# Patient Record
Sex: FEMALE | Race: WHITE | HISPANIC OR LATINO
[De-identification: names, ages, dates, MRNs, and addresses within clinical notes are randomized per-mention and may not be internally consistent; named-entity substitution may affect disease eponyms.]

---
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
## 2022-01-01 ENCOUNTER — RX RENEWAL (OUTPATIENT)
Age: 22
End: 2022-01-01

## 2022-01-24 ENCOUNTER — RX RENEWAL (OUTPATIENT)
Age: 22
End: 2022-01-24

## 2022-03-05 ENCOUNTER — RX RENEWAL (OUTPATIENT)
Age: 22
End: 2022-03-05

## 2022-04-03 ENCOUNTER — RX RENEWAL (OUTPATIENT)
Age: 22
End: 2022-04-03

## 2022-05-04 ENCOUNTER — RX RENEWAL (OUTPATIENT)
Age: 22
End: 2022-05-04

## 2022-06-06 ENCOUNTER — RX RENEWAL (OUTPATIENT)
Age: 22
End: 2022-06-06

## 2022-06-29 ENCOUNTER — NON-APPOINTMENT (OUTPATIENT)
Age: 22
End: 2022-06-29

## 2022-06-29 ENCOUNTER — APPOINTMENT (OUTPATIENT)
Dept: ELECTROPHYSIOLOGY | Facility: CLINIC | Age: 22
End: 2022-06-29

## 2022-06-29 ENCOUNTER — APPOINTMENT (OUTPATIENT)
Dept: CARDIOLOGY | Facility: CLINIC | Age: 22
End: 2022-06-29
Payer: COMMERCIAL

## 2022-06-29 VITALS
OXYGEN SATURATION: 99 % | WEIGHT: 130 LBS | BODY MASS INDEX: 23.04 KG/M2 | HEIGHT: 63 IN | DIASTOLIC BLOOD PRESSURE: 74 MMHG | SYSTOLIC BLOOD PRESSURE: 122 MMHG | HEART RATE: 63 BPM

## 2022-06-29 VITALS
DIASTOLIC BLOOD PRESSURE: 68 MMHG | BODY MASS INDEX: 23.57 KG/M2 | HEART RATE: 73 BPM | TEMPERATURE: 99 F | SYSTOLIC BLOOD PRESSURE: 100 MMHG | WEIGHT: 133 LBS | OXYGEN SATURATION: 99 % | HEIGHT: 63 IN

## 2022-06-29 DIAGNOSIS — Z12.39 ENCOUNTER FOR OTHER SCREENING FOR MALIGNANT NEOPLASM OF BREAST: ICD-10-CM

## 2022-06-29 DIAGNOSIS — Z12.83 ENCOUNTER FOR SCREENING FOR MALIGNANT NEOPLASM OF SKIN: ICD-10-CM

## 2022-06-29 PROCEDURE — 93000 ELECTROCARDIOGRAM COMPLETE: CPT | Mod: 77

## 2022-06-29 PROCEDURE — 99214 OFFICE O/P EST MOD 30 MIN: CPT | Mod: 25

## 2022-06-29 PROCEDURE — 99204 OFFICE O/P NEW MOD 45 MIN: CPT | Mod: 25

## 2022-06-29 PROCEDURE — 93000 ELECTROCARDIOGRAM COMPLETE: CPT

## 2022-06-29 PROCEDURE — 99214 OFFICE O/P EST MOD 30 MIN: CPT

## 2022-06-29 RX ORDER — VENLAFAXINE HYDROCHLORIDE 37.5 MG/1
37.5 CAPSULE, EXTENDED RELEASE ORAL
Qty: 30 | Refills: 0 | Status: DISCONTINUED | COMMUNITY
Start: 2022-01-24 | End: 2022-06-29

## 2022-06-29 NOTE — REVIEW OF SYSTEMS
[Negative] : Heme/Lymph [SOB] : no shortness of breath [Dyspnea on exertion] : not dyspnea during exertion [Chest Discomfort] : no chest discomfort [Leg Claudication] : no intermittent leg claudication [Palpitations] : no palpitations

## 2022-06-29 NOTE — DISCUSSION/SUMMARY
[FreeTextEntry1] : In summary, this is a 22 year old woman with a cardiovascular history significant for pre-syncope/syncope. Her past medical history is significant for depression, pituitary abnormality, and migraines.\par \par Here recent episode appears to be vagal per history. Her EKG shows no conduction disease. She is currently wearing a Holter, and will be undergoing TTE soon. I will have her return to clinic in 4 weeks. \par  \par  appeared to understand the whole discussion and verbalized that all of her questions were answered to her satisfaction.\par  \par Thank you for allowing me to be involved in the care of this pleasant woman. Please feel free to contact me with any questions.\par

## 2022-06-29 NOTE — HISTORY OF PRESENT ILLNESS
[FreeTextEntry1] : This is a 23yo female with PMH of depression and pituitary abnormality who presents today for follow-up. Patient reports that approximately 1 month ago she had a near syncope instead. Patient states she was at work, felt cold and lost vision, was shaking and her co-workers assisted her to sit. Patient did not lose consciousness, states symptoms lasted for approximately 1 minute. Patient states that after a minute she still felt shaky but other symptoms subsided. Patient did not seek treatment. Patient does not recall any precipitating factors, reports she felt fine beforehand, does report an episode of syncope a few years ago. Patient reports hx of dizziness and has continued to occur usually when she bends down.

## 2022-06-29 NOTE — HISTORY OF PRESENT ILLNESS
[FreeTextEntry1] : Referring Physician: Car Grace MD\par \par Dear Dr. Grace:\par  \par Ms. Brooks was seen in the [facility] today. For our records, please allow me to summarize the history and my findings.\par  \par This pleasant 22 year old woman has a cardiovascular history significant for syncope. Her past medical history is significant for depression, pituitary abnormality, and migraines. About 1 month ago she experienced an episode or presyncope. 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 She noted her vision begin to black out. She was assisted down by her co-workers, and was told she was pale. The symptoms lasted about 1 minute before she was back at her baseline. She states this has never happened to her before. She has been on her feet a lot that day. She recalls an episode of syncope in the shower when she was in 10th grade. States it occurred in the afternoon, did not have a prodrome that time, remembers waking up in the shower. \par \par She also endorses feeling vertigo when changing positions. \par  \par Ms. Brooks denies any recent history of chest pain, shortness of breath, palpitations.\par

## 2022-07-03 PROCEDURE — 93224 XTRNL ECG REC UP TO 48 HRS: CPT

## 2022-07-29 ENCOUNTER — APPOINTMENT (OUTPATIENT)
Dept: CARDIOLOGY | Facility: CLINIC | Age: 22
End: 2022-07-29

## 2022-07-29 VITALS
BODY MASS INDEX: 23.21 KG/M2 | SYSTOLIC BLOOD PRESSURE: 115 MMHG | HEIGHT: 63 IN | WEIGHT: 131 LBS | HEART RATE: 64 BPM | DIASTOLIC BLOOD PRESSURE: 69 MMHG | TEMPERATURE: 99.1 F

## 2022-07-29 DIAGNOSIS — Z87.898 PERSONAL HISTORY OF OTHER SPECIFIED CONDITIONS: ICD-10-CM

## 2022-07-29 DIAGNOSIS — E23.7 DISORDER OF PITUITARY GLAND, UNSPECIFIED: ICD-10-CM

## 2022-07-29 DIAGNOSIS — G43.909 MIGRAINE, UNSPECIFIED, NOT INTRACTABLE, W/OUT STATUS MIGRAINOSUS: ICD-10-CM

## 2022-07-29 DIAGNOSIS — R53.83 OTHER FATIGUE: ICD-10-CM

## 2022-07-29 PROCEDURE — 99213 OFFICE O/P EST LOW 20 MIN: CPT

## 2022-07-29 NOTE — HISTORY OF PRESENT ILLNESS
[FreeTextEntry1] : This is a 22 year old female who presents today for follow up visit after being evaluated in office 6/29/2022 for syncopy and dizziness. She followed up with Dr. Kumar (EPS) as advised. She is s/p normal holter monitor study. She also follows with neurology for hx of migraines and dizziness. She reports her dizziness episodes have been occurring less frequently. She reports increased fatigue, she reports she sleeps 8-10 hours per night and stil wakes up groggy and it is affecting her daily life.  Denies chest pain, dyspnea, palpations, changes in appetite/bowel habits, nausea, vomiting, abdominal pain.\par

## 2022-08-01 ENCOUNTER — APPOINTMENT (OUTPATIENT)
Dept: CARDIOLOGY | Facility: CLINIC | Age: 22
End: 2022-08-01

## 2022-08-01 PROCEDURE — 93306 TTE W/DOPPLER COMPLETE: CPT

## 2022-08-02 ENCOUNTER — RX RENEWAL (OUTPATIENT)
Age: 22
End: 2022-08-02

## 2022-08-03 ENCOUNTER — APPOINTMENT (OUTPATIENT)
Dept: ELECTROPHYSIOLOGY | Facility: CLINIC | Age: 22
End: 2022-08-03

## 2022-08-04 ENCOUNTER — NON-APPOINTMENT (OUTPATIENT)
Age: 22
End: 2022-08-04

## 2022-08-04 DIAGNOSIS — R89.9 UNSPECIFIED ABNORMAL FINDING IN SPECIMENS FROM OTHER ORGANS, SYSTEMS AND TISSUES: ICD-10-CM

## 2022-08-04 LAB
ALBUMIN SERPL ELPH-MCNC: 4.3 G/DL
ALBUMIN SERPL ELPH-MCNC: 4.5 G/DL
ALP BLD-CCNC: 52 U/L
ALP BLD-CCNC: 57 U/L
ALT SERPL-CCNC: 12 U/L
ALT SERPL-CCNC: 19 U/L
ANION GAP SERPL CALC-SCNC: 10 MMOL/L
ANION GAP SERPL CALC-SCNC: 13 MMOL/L
AST SERPL-CCNC: 16 U/L
AST SERPL-CCNC: 18 U/L
BASOPHILS # BLD AUTO: 0.01 K/UL
BASOPHILS # BLD AUTO: 0.03 K/UL
BASOPHILS NFR BLD AUTO: 0.2 %
BASOPHILS NFR BLD AUTO: 0.4 %
BILIRUB SERPL-MCNC: 0.3 MG/DL
BILIRUB SERPL-MCNC: 0.4 MG/DL
BUN SERPL-MCNC: 9 MG/DL
BUN SERPL-MCNC: 9 MG/DL
CALCIUM SERPL-MCNC: 9.2 MG/DL
CALCIUM SERPL-MCNC: 9.4 MG/DL
CHLORIDE SERPL-SCNC: 104 MMOL/L
CHLORIDE SERPL-SCNC: 109 MMOL/L
CO2 SERPL-SCNC: 20 MMOL/L
CO2 SERPL-SCNC: 23 MMOL/L
CREAT SERPL-MCNC: 0.73 MG/DL
CREAT SERPL-MCNC: 0.9 MG/DL
EGFR: 94 ML/MIN/1.73M2
EOSINOPHIL # BLD AUTO: 0.01 K/UL
EOSINOPHIL # BLD AUTO: 0.03 K/UL
EOSINOPHIL NFR BLD AUTO: 0.2 %
EOSINOPHIL NFR BLD AUTO: 0.4 %
ESTIMATED AVERAGE GLUCOSE: 105 MG/DL
ESTIMATED AVERAGE GLUCOSE: 111 MG/DL
GLUCOSE SERPL-MCNC: 72 MG/DL
GLUCOSE SERPL-MCNC: 87 MG/DL
HBA1C MFR BLD HPLC: 5.3 %
HBA1C MFR BLD HPLC: 5.5 %
HCG SERPL-MCNC: <1 MIU/ML
HCG SERPL-MCNC: <1 MIU/ML
HCT VFR BLD CALC: 38.7 %
HCT VFR BLD CALC: 39.1 %
HGB BLD-MCNC: 11.9 G/DL
HGB BLD-MCNC: 12.9 G/DL
IMM GRANULOCYTES NFR BLD AUTO: 0.2 %
IMM GRANULOCYTES NFR BLD AUTO: 0.2 %
LYMPHOCYTES # BLD AUTO: 1.6 K/UL
LYMPHOCYTES # BLD AUTO: 1.94 K/UL
LYMPHOCYTES NFR BLD AUTO: 22.7 %
LYMPHOCYTES NFR BLD AUTO: 27.2 %
M TB IFN-G BLD-IMP: NEGATIVE
MAN DIFF?: NORMAL
MAN DIFF?: NORMAL
MCHC RBC-ENTMCNC: 27.2 PG
MCHC RBC-ENTMCNC: 29.1 PG
MCHC RBC-ENTMCNC: 30.7 GM/DL
MCHC RBC-ENTMCNC: 33 GM/DL
MCV RBC AUTO: 88.3 FL
MCV RBC AUTO: 88.6 FL
MONOCYTES # BLD AUTO: 0.56 K/UL
MONOCYTES # BLD AUTO: 0.64 K/UL
MONOCYTES NFR BLD AUTO: 7.5 %
MONOCYTES NFR BLD AUTO: 9.5 %
NEUTROPHILS # BLD AUTO: 3.69 K/UL
NEUTROPHILS # BLD AUTO: 5.89 K/UL
NEUTROPHILS NFR BLD AUTO: 62.7 %
NEUTROPHILS NFR BLD AUTO: 68.8 %
PLATELET # BLD AUTO: 275 K/UL
PLATELET # BLD AUTO: 281 K/UL
POTASSIUM SERPL-SCNC: 3.7 MMOL/L
POTASSIUM SERPL-SCNC: 4.6 MMOL/L
PROLACTIN SERPL-MCNC: 17.8 NG/ML
PROLACTIN SERPL-MCNC: 21.3 NG/ML
PROT SERPL-MCNC: 6.9 G/DL
PROT SERPL-MCNC: 7.7 G/DL
QUANTIFERON TB PLUS MITOGEN MINUS NIL: 9.69 IU/ML
QUANTIFERON TB PLUS NIL: 0.01 IU/ML
QUANTIFERON TB PLUS TB1 MINUS NIL: 0.01 IU/ML
QUANTIFERON TB PLUS TB2 MINUS NIL: 0 IU/ML
RBC # BLD: 4.37 M/UL
RBC # BLD: 4.43 M/UL
RBC # FLD: 12 %
RBC # FLD: 13.3 %
SODIUM SERPL-SCNC: 137 MMOL/L
SODIUM SERPL-SCNC: 142 MMOL/L
T4 FREE SERPL-MCNC: 1.2 NG/DL
T4 FREE SERPL-MCNC: 1.4 NG/DL
TSH SERPL-ACNC: 1.18 UIU/ML
TSH SERPL-ACNC: 1.22 UIU/ML
WBC # FLD AUTO: 5.88 K/UL
WBC # FLD AUTO: 8.55 K/UL

## 2022-08-23 ENCOUNTER — NON-APPOINTMENT (OUTPATIENT)
Age: 22
End: 2022-08-23

## 2022-08-23 ENCOUNTER — APPOINTMENT (OUTPATIENT)
Dept: PULMONOLOGY | Facility: CLINIC | Age: 22
End: 2022-08-23

## 2022-08-23 VITALS
TEMPERATURE: 98.3 F | OXYGEN SATURATION: 98 % | HEART RATE: 88 BPM | DIASTOLIC BLOOD PRESSURE: 83 MMHG | SYSTOLIC BLOOD PRESSURE: 131 MMHG

## 2022-08-23 PROCEDURE — 99203 OFFICE O/P NEW LOW 30 MIN: CPT

## 2022-08-24 NOTE — ASSESSMENT
[FreeTextEntry1] : Hypersomnia.  Given age narcolepsy is definitely a consideration.  Should have home sleep study for initial assessment and if negative proceed to polysomnography with MSLT testing

## 2022-08-24 NOTE — HISTORY OF PRESENT ILLNESS
[TextBox_4] : Patient presents for evaluation of excessive daytime sleepiness.  Has fallen asleep driving and has difficulty maintaining alertness.  Symptoms appear to be worsening over the past 6 months Father has sleep apnea no history of loud snoring.  Does report color dreaming reports intermittent daytime weakness as well as sleep paralysis.  Does not often nap during the day and when she does does not find restorative.\par \par Does not report difficulty initiating sleep or subjective insomnia.

## 2022-09-12 ENCOUNTER — RX RENEWAL (OUTPATIENT)
Age: 22
End: 2022-09-12

## 2022-09-13 ENCOUNTER — APPOINTMENT (OUTPATIENT)
Dept: CARDIOLOGY | Facility: CLINIC | Age: 22
End: 2022-09-13

## 2022-09-13 VITALS
DIASTOLIC BLOOD PRESSURE: 60 MMHG | HEART RATE: 72 BPM | HEIGHT: 63 IN | WEIGHT: 133 LBS | TEMPERATURE: 98.2 F | BODY MASS INDEX: 23.57 KG/M2 | SYSTOLIC BLOOD PRESSURE: 102 MMHG

## 2022-09-13 PROCEDURE — 99213 OFFICE O/P EST LOW 20 MIN: CPT

## 2022-09-13 NOTE — HISTORY OF PRESENT ILLNESS
[FreeTextEntry1] : This is a 22 year old female who presents today for follow up visit for syncope and dizziness. \par pt reports overall feeling better. reports episodes of dizziness has lessened only occurs if she does not drink enough water\par Pt denies any chest pain SOB or heart palpitations

## 2022-09-16 ENCOUNTER — APPOINTMENT (OUTPATIENT)
Dept: PULMONOLOGY | Facility: CLINIC | Age: 22
End: 2022-09-16

## 2022-09-16 PROCEDURE — 95800 SLP STDY UNATTENDED: CPT | Mod: 52

## 2022-09-18 PROCEDURE — 95800 SLP STDY UNATTENDED: CPT

## 2022-10-19 ENCOUNTER — APPOINTMENT (OUTPATIENT)
Dept: OBGYN | Facility: CLINIC | Age: 22
End: 2022-10-19

## 2022-10-19 VITALS
HEIGHT: 63 IN | BODY MASS INDEX: 23.57 KG/M2 | SYSTOLIC BLOOD PRESSURE: 124 MMHG | WEIGHT: 133 LBS | DIASTOLIC BLOOD PRESSURE: 79 MMHG | OXYGEN SATURATION: 98 % | HEART RATE: 92 BPM

## 2022-10-19 PROCEDURE — 99395 PREV VISIT EST AGE 18-39: CPT

## 2022-10-19 PROCEDURE — 81025 URINE PREGNANCY TEST: CPT

## 2022-10-19 NOTE — HISTORY OF PRESENT ILLNESS
[FreeTextEntry1] : KAM MOLINA 22 year old G0  LMP 10/07/2022, She presents for an annual gyn exam.  pt report she ran out of OCP refills and has been off pill x 2 months,  has been using condoms.  She reports 3 episodes of vaginal bleeding this past month, has a history of frequent menses.  She is sexually active w her long term partner. She started Qulipta for headaches and it is working well. Also started Venlafaxine for depression and anxiety by PCP and doing well. recently.  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

## 2022-10-19 NOTE — END OF VISIT
[FreeTextEntry3] : This note was written by Alejandra spencer on 10/19/2022 actively solely ELMO Gallagher M.D.  All medical record entries made by the [facility] were at my, ELMO Gallagher M.D. direction and personally dictated by me on 10/19/2022. I have personally reviewed the chart and agree that the record reflects my personal performance of the history, physical exam, assessment, and plan.\par

## 2022-10-19 NOTE — PLAN
[FreeTextEntry1] : KAM DECK 22 year presents for annual exam \par \par 1.HCM\par -Pap smear\par \par 2. AUB\par -ucg negative\par -Restart Levora\par RTO in 1 year or PRN\par \par

## 2022-10-20 LAB
C TRACH RRNA SPEC QL NAA+PROBE: NOT DETECTED
N GONORRHOEA RRNA SPEC QL NAA+PROBE: NOT DETECTED
SOURCE TP AMPLIFICATION: NORMAL

## 2022-10-23 LAB — HPV HIGH+LOW RISK DNA PNL CVX: NOT DETECTED

## 2022-10-24 LAB — CYTOLOGY CVX/VAG DOC THIN PREP: NORMAL

## 2022-10-25 ENCOUNTER — APPOINTMENT (OUTPATIENT)
Dept: PULMONOLOGY | Facility: CLINIC | Age: 22
End: 2022-10-25

## 2022-10-25 VITALS
HEART RATE: 72 BPM | RESPIRATION RATE: 16 BRPM | OXYGEN SATURATION: 97 % | SYSTOLIC BLOOD PRESSURE: 112 MMHG | DIASTOLIC BLOOD PRESSURE: 69 MMHG

## 2022-10-25 PROCEDURE — 99213 OFFICE O/P EST LOW 20 MIN: CPT

## 2022-10-27 NOTE — HISTORY OF PRESENT ILLNESS
[TextBox_4] : Follow-up sleep evaluation continues to complain of daytime sleepiness nonrestorative sleep.  Underwent home sleep study here for review

## 2022-10-27 NOTE — ASSESSMENT
[FreeTextEntry1] : Hypersomnia.  Repeated her [place] questionnaire and she remains sleepy.  Home sleep study does not provide explanation for symptoms recommend proceeding to polysomnography with MSLT testing

## 2022-12-14 ENCOUNTER — APPOINTMENT (OUTPATIENT)
Dept: SPINE | Facility: CLINIC | Age: 22
End: 2022-12-14

## 2022-12-14 VITALS
WEIGHT: 133 LBS | HEART RATE: 72 BPM | SYSTOLIC BLOOD PRESSURE: 118 MMHG | DIASTOLIC BLOOD PRESSURE: 76 MMHG | OXYGEN SATURATION: 99 % | HEIGHT: 63 IN | BODY MASS INDEX: 23.57 KG/M2

## 2022-12-14 PROCEDURE — 99213 OFFICE O/P EST LOW 20 MIN: CPT

## 2023-01-17 ENCOUNTER — APPOINTMENT (OUTPATIENT)
Dept: CARDIOLOGY | Facility: CLINIC | Age: 23
End: 2023-01-17
Payer: COMMERCIAL

## 2023-01-17 ENCOUNTER — NON-APPOINTMENT (OUTPATIENT)
Age: 23
End: 2023-01-17

## 2023-01-17 VITALS
BODY MASS INDEX: 24.98 KG/M2 | DIASTOLIC BLOOD PRESSURE: 60 MMHG | TEMPERATURE: 98.4 F | WEIGHT: 141 LBS | SYSTOLIC BLOOD PRESSURE: 98 MMHG | HEART RATE: 78 BPM | OXYGEN SATURATION: 99 % | HEIGHT: 63 IN

## 2023-01-17 DIAGNOSIS — R90.89 OTHER ABNORMAL FINDINGS ON DIAGNOSTIC IMAGING OF CENTRAL NERVOUS SYSTEM: ICD-10-CM

## 2023-01-17 DIAGNOSIS — G47.10 HYPERSOMNIA, UNSPECIFIED: ICD-10-CM

## 2023-01-17 PROCEDURE — 99214 OFFICE O/P EST MOD 30 MIN: CPT | Mod: 25

## 2023-01-17 PROCEDURE — 93000 ELECTROCARDIOGRAM COMPLETE: CPT

## 2023-01-17 NOTE — HISTORY OF PRESENT ILLNESS
[FreeTextEntry1] : This is a 23 y/o female with a pmhx of near syncope, dizziness here today for a follow up. PAtient had repeat MRI brain and pituitary 12/13/22 which showed stable findings. Patient reports she has been feeling better overall. She reports the dizziness has become less frequent and she had no episodes of near syncope. \par \par Patient denies chest pain, dyspnea, palpitations, dizziness, syncope, changes in bowel/bladder habits or appetite. \par

## 2023-03-07 ENCOUNTER — RX RENEWAL (OUTPATIENT)
Age: 23
End: 2023-03-07

## 2023-06-06 ENCOUNTER — APPOINTMENT (OUTPATIENT)
Dept: CARDIOLOGY | Facility: CLINIC | Age: 23
End: 2023-06-06
Payer: COMMERCIAL

## 2023-06-06 ENCOUNTER — NON-APPOINTMENT (OUTPATIENT)
Age: 23
End: 2023-06-06

## 2023-06-06 VITALS
TEMPERATURE: 98.9 F | HEIGHT: 63 IN | HEART RATE: 80 BPM | BODY MASS INDEX: 25.52 KG/M2 | DIASTOLIC BLOOD PRESSURE: 60 MMHG | OXYGEN SATURATION: 99 % | WEIGHT: 144 LBS | SYSTOLIC BLOOD PRESSURE: 90 MMHG

## 2023-06-06 DIAGNOSIS — R55 SYNCOPE AND COLLAPSE: ICD-10-CM

## 2023-06-06 DIAGNOSIS — E23.6 OTHER DISORDERS OF PITUITARY GLAND: ICD-10-CM

## 2023-06-06 DIAGNOSIS — Z13.228 ENCOUNTER FOR SCREENING FOR OTHER METABOLIC DISORDERS: ICD-10-CM

## 2023-06-06 DIAGNOSIS — F32.A DEPRESSION, UNSPECIFIED: ICD-10-CM

## 2023-06-06 DIAGNOSIS — E55.9 VITAMIN D DEFICIENCY, UNSPECIFIED: ICD-10-CM

## 2023-06-06 DIAGNOSIS — R42 DIZZINESS AND GIDDINESS: ICD-10-CM

## 2023-06-06 PROCEDURE — 93000 ELECTROCARDIOGRAM COMPLETE: CPT

## 2023-06-06 PROCEDURE — 99214 OFFICE O/P EST MOD 30 MIN: CPT | Mod: 25

## 2023-06-06 NOTE — HISTORY OF PRESENT ILLNESS
[FreeTextEntry1] : This is a 22 y/o female with a pmhx of near syncope, dizziness here today for a routine follow up. pt reports overall feeling well reports occasional dizziness denies any further episodes of passing out\par \par Patient denies chest pain, dyspnea, palpitations, dizziness, syncope, changes in bowel/bladder habits or appetite. \par

## 2023-06-07 LAB
25(OH)D3 SERPL-MCNC: 28.1 NG/ML
ALBUMIN SERPL ELPH-MCNC: 4.6 G/DL
ALP BLD-CCNC: 63 U/L
ALT SERPL-CCNC: 15 U/L
ANION GAP SERPL CALC-SCNC: 11 MMOL/L
APPEARANCE: CLEAR
AST SERPL-CCNC: 14 U/L
BACTERIA: NEGATIVE /HPF
BILIRUB SERPL-MCNC: 0.2 MG/DL
BILIRUBIN URINE: NEGATIVE
BLOOD URINE: NEGATIVE
BUN SERPL-MCNC: 18 MG/DL
CALCIUM SERPL-MCNC: 10 MG/DL
CAST: 0 /LPF
CHLORIDE SERPL-SCNC: 105 MMOL/L
CHOLEST SERPL-MCNC: 182 MG/DL
CO2 SERPL-SCNC: 25 MMOL/L
COLOR: YELLOW
CREAT SERPL-MCNC: 0.85 MG/DL
EBV EA AB SER IA-ACNC: <5 U/ML
EBV EA AB TITR SER IF: NEGATIVE
EBV EA IGG SER QL IA: <3 U/ML
EBV EA IGG SER-ACNC: NEGATIVE
EBV EA IGM SER IA-ACNC: NEGATIVE
EBV PATRN SPEC IB-IMP: NORMAL
EBV VCA IGG SER IA-ACNC: <10 U/ML
EBV VCA IGM SER QL IA: <10 U/ML
EGFR: 100 ML/MIN/1.73M2
EPITHELIAL CELLS: 4 /HPF
EPSTEIN-BARR VIRUS CAPSID ANTIGEN IGG: NEGATIVE
ESTIMATED AVERAGE GLUCOSE: 111 MG/DL
FERRITIN SERPL-MCNC: 24 NG/ML
FOLATE SERPL-MCNC: 8.5 NG/ML
GLUCOSE QUALITATIVE U: NEGATIVE MG/DL
GLUCOSE SERPL-MCNC: 78 MG/DL
HBA1C MFR BLD HPLC: 5.5 %
HDLC SERPL-MCNC: 66 MG/DL
IRON SATN MFR SERPL: 43 %
IRON SERPL-MCNC: 161 UG/DL
KETONES URINE: NEGATIVE MG/DL
LDLC SERPL CALC-MCNC: 100 MG/DL
LEUKOCYTE ESTERASE URINE: NEGATIVE
MICROSCOPIC-UA: NORMAL
NITRITE URINE: NEGATIVE
NONHDLC SERPL-MCNC: 116 MG/DL
PH URINE: 5.5
POTASSIUM SERPL-SCNC: 4.7 MMOL/L
PROT SERPL-MCNC: 7.4 G/DL
PROTEIN URINE: NEGATIVE MG/DL
RED BLOOD CELLS URINE: 1 /HPF
SODIUM SERPL-SCNC: 141 MMOL/L
SPECIFIC GRAVITY URINE: 1.03
T4 FREE SERPL-MCNC: 1.5 NG/DL
TIBC SERPL-MCNC: 377 UG/DL
TRANSFERRIN SERPL-MCNC: 311 MG/DL
TRIGL SERPL-MCNC: 76 MG/DL
TSH SERPL-ACNC: 0.9 UIU/ML
UIBC SERPL-MCNC: 216 UG/DL
UROBILINOGEN URINE: 0.2 MG/DL
VIT B12 SERPL-MCNC: 442 PG/ML
WHITE BLOOD CELLS URINE: 2 /HPF

## 2023-11-27 ENCOUNTER — APPOINTMENT (OUTPATIENT)
Dept: OBGYN | Facility: CLINIC | Age: 23
End: 2023-11-27
Payer: COMMERCIAL

## 2023-11-27 VITALS
WEIGHT: 140 LBS | HEIGHT: 63 IN | DIASTOLIC BLOOD PRESSURE: 60 MMHG | BODY MASS INDEX: 24.8 KG/M2 | SYSTOLIC BLOOD PRESSURE: 106 MMHG

## 2023-11-27 DIAGNOSIS — Z01.419 ENCOUNTER FOR GYNECOLOGICAL EXAMINATION (GENERAL) (ROUTINE) W/OUT ABNORMAL FINDINGS: ICD-10-CM

## 2023-11-27 PROCEDURE — 99395 PREV VISIT EST AGE 18-39: CPT

## 2023-11-27 RX ORDER — LEVONORGESTREL AND ETHINYL ESTRADIOL 0.15-0.03
0.15-3 KIT ORAL
Qty: 84 | Refills: 3 | Status: ACTIVE | COMMUNITY
Start: 2021-07-09 | End: 1900-01-01

## 2023-12-08 ENCOUNTER — APPOINTMENT (OUTPATIENT)
Dept: CARDIOLOGY | Facility: CLINIC | Age: 23
End: 2023-12-08

## 2024-02-25 ENCOUNTER — RX RENEWAL (OUTPATIENT)
Age: 24
End: 2024-02-25

## 2024-05-06 LAB
25(OH)D3 SERPL-MCNC: 22.2 NG/ML
ACTH SER-ACNC: 11.6 PG/ML
ALBUMIN SERPL ELPH-MCNC: 4.3 G/DL
ALBUMIN SERPL ELPH-MCNC: 4.7 G/DL
ALP BLD-CCNC: 66 U/L
ALP BLD-CCNC: 70 U/L
ALT SERPL-CCNC: 15 U/L
ALT SERPL-CCNC: 34 U/L
ANION GAP SERPL CALC-SCNC: 12 MMOL/L
ANION GAP SERPL CALC-SCNC: 14 MMOL/L
APPEARANCE: CLEAR
AST SERPL-CCNC: 14 U/L
AST SERPL-CCNC: 35 U/L
BACTERIA: NEGATIVE
BASOPHILS # BLD AUTO: 0.01 K/UL
BASOPHILS # BLD AUTO: 0.03 K/UL
BASOPHILS NFR BLD AUTO: 0.1 %
BASOPHILS NFR BLD AUTO: 0.4 %
BILIRUB SERPL-MCNC: 0.2 MG/DL
BILIRUB SERPL-MCNC: 0.2 MG/DL
BILIRUBIN URINE: NEGATIVE
BLOOD URINE: NEGATIVE
BUN SERPL-MCNC: 14 MG/DL
BUN SERPL-MCNC: 16 MG/DL
CALCIUM SERPL-MCNC: 8.9 MG/DL
CALCIUM SERPL-MCNC: 9.4 MG/DL
CHLORIDE SERPL-SCNC: 105 MMOL/L
CHLORIDE SERPL-SCNC: 108 MMOL/L
CHOLEST SERPL-MCNC: 166 MG/DL
CO2 SERPL-SCNC: 20 MMOL/L
CO2 SERPL-SCNC: 20 MMOL/L
COLOR: NORMAL
CORTICOSTEROID BIND GLOBULIN: 2 MG/DL
CORTIS SERPL-MCNC: 6 UG/DL
CORTISOL, FREE: 0.26 UG/DL
CREAT SERPL-MCNC: 0.76 MG/DL
CREAT SERPL-MCNC: 0.79 MG/DL
EGFR: 110 ML/MIN/1.73M2
EGFR: 115 ML/MIN/1.73M2
EOSINOPHIL # BLD AUTO: 0.01 K/UL
EOSINOPHIL # BLD AUTO: 0.06 K/UL
EOSINOPHIL NFR BLD AUTO: 0.1 %
EOSINOPHIL NFR BLD AUTO: 0.7 %
ESTIMATED AVERAGE GLUCOSE: 105 MG/DL
ESTIMATED AVERAGE GLUCOSE: 108 MG/DL
FERRITIN SERPL-MCNC: 18 NG/ML
FOLATE SERPL-MCNC: 12.5 NG/ML
GLUCOSE QUALITATIVE U: NEGATIVE
GLUCOSE SERPL-MCNC: 77 MG/DL
GLUCOSE SERPL-MCNC: 82 MG/DL
HAPTOGLOB SERPL-MCNC: 97 MG/DL
HBA1C MFR BLD HPLC: 5.3 %
HBA1C MFR BLD HPLC: 5.4 %
HCG SERPL-MCNC: <1 MIU/ML
HCT VFR BLD CALC: 41.3 %
HCT VFR BLD CALC: 43 %
HDLC SERPL-MCNC: 58 MG/DL
HGB BLD-MCNC: 13 G/DL
HGB BLD-MCNC: 13.6 G/DL
HYALINE CASTS: 1 /LPF
IMM GRANULOCYTES NFR BLD AUTO: 0.2 %
IMM GRANULOCYTES NFR BLD AUTO: 0.2 %
IRON SERPL-MCNC: 62 UG/DL
KETONES URINE: NEGATIVE
LDH SERPL-CCNC: 156 U/L
LDLC SERPL CALC-MCNC: 99 MG/DL
LEUKOCYTE ESTERASE URINE: NEGATIVE
LYMPHOCYTES # BLD AUTO: 2.24 K/UL
LYMPHOCYTES # BLD AUTO: 2.32 K/UL
LYMPHOCYTES NFR BLD AUTO: 26.2 %
LYMPHOCYTES NFR BLD AUTO: 27.1 %
MAN DIFF?: NORMAL
MAN DIFF?: NORMAL
MCHC RBC-ENTMCNC: 27.6 PG
MCHC RBC-ENTMCNC: 27.7 PG
MCHC RBC-ENTMCNC: 31.5 GM/DL
MCHC RBC-ENTMCNC: 31.6 GM/DL
MCV RBC AUTO: 87.4 FL
MCV RBC AUTO: 87.9 FL
MICROSCOPIC-UA: NORMAL
MONOCYTES # BLD AUTO: 0.62 K/UL
MONOCYTES # BLD AUTO: 0.95 K/UL
MONOCYTES NFR BLD AUTO: 10.7 %
MONOCYTES NFR BLD AUTO: 7.5 %
NEUTROPHILS # BLD AUTO: 5.3 K/UL
NEUTROPHILS # BLD AUTO: 5.54 K/UL
NEUTROPHILS NFR BLD AUTO: 62.7 %
NEUTROPHILS NFR BLD AUTO: 64.1 %
NITRITE URINE: NEGATIVE
NONHDLC SERPL-MCNC: 107 MG/DL
PFCX: 4.3 %
PH URINE: 5.5
PLATELET # BLD AUTO: 245 K/UL
PLATELET # BLD AUTO: 306 K/UL
POTASSIUM SERPL-SCNC: 4.1 MMOL/L
POTASSIUM SERPL-SCNC: 4.3 MMOL/L
PROLACTIN SERPL-MCNC: 23.2 NG/ML
PROT SERPL-MCNC: 7.2 G/DL
PROT SERPL-MCNC: 7.3 G/DL
PROTEIN URINE: NEGATIVE
RBC # BLD: 4.7 M/UL
RBC # BLD: 4.92 M/UL
RBC # FLD: 12.9 %
RBC # FLD: 13.3 %
RED BLOOD CELLS URINE: 2 /HPF
SODIUM SERPL-SCNC: 137 MMOL/L
SODIUM SERPL-SCNC: 142 MMOL/L
SPECIFIC GRAVITY URINE: 1.03
SQUAMOUS EPITHELIAL CELLS: 2 /HPF
T4 FREE SERPL-MCNC: 1.2 NG/DL
T4 FREE SERPL-MCNC: 1.3 NG/DL
TRANSFERRIN SERPL-MCNC: 298 MG/DL
TRIGL SERPL-MCNC: 43 MG/DL
TSH SERPL-ACNC: 0.98 UIU/ML
TSH SERPL-ACNC: 1.17 UIU/ML
UROBILINOGEN URINE: NORMAL
VIT B12 SERPL-MCNC: 487 PG/ML
WBC # FLD AUTO: 8.27 K/UL
WBC # FLD AUTO: 8.85 K/UL
WHITE BLOOD CELLS URINE: 1 /HPF

## 2024-05-28 ENCOUNTER — RX RENEWAL (OUTPATIENT)
Age: 24
End: 2024-05-28

## 2024-05-28 RX ORDER — VENLAFAXINE HYDROCHLORIDE 75 MG/1
75 CAPSULE, EXTENDED RELEASE ORAL
Qty: 90 | Refills: 0 | Status: ACTIVE | COMMUNITY
Start: 2021-11-04 | End: 1900-01-01

## 2024-07-30 ENCOUNTER — APPOINTMENT (OUTPATIENT)
Dept: CARDIOLOGY | Facility: CLINIC | Age: 24
End: 2024-07-30
Payer: COMMERCIAL

## 2024-07-30 VITALS
HEART RATE: 73 BPM | OXYGEN SATURATION: 99 % | DIASTOLIC BLOOD PRESSURE: 72 MMHG | HEIGHT: 63 IN | BODY MASS INDEX: 26.22 KG/M2 | TEMPERATURE: 98.5 F | SYSTOLIC BLOOD PRESSURE: 105 MMHG | WEIGHT: 148 LBS

## 2024-07-30 DIAGNOSIS — G43.909 MIGRAINE, UNSPECIFIED, NOT INTRACTABLE, W/OUT STATUS MIGRAINOSUS: ICD-10-CM

## 2024-07-30 DIAGNOSIS — F32.A DEPRESSION, UNSPECIFIED: ICD-10-CM

## 2024-07-30 DIAGNOSIS — Z12.83 ENCOUNTER FOR SCREENING FOR MALIGNANT NEOPLASM OF SKIN: ICD-10-CM

## 2024-07-30 DIAGNOSIS — Z12.39 ENCOUNTER FOR OTHER SCREENING FOR MALIGNANT NEOPLASM OF BREAST: ICD-10-CM

## 2024-07-30 DIAGNOSIS — Z00.00 ENCOUNTER FOR GENERAL ADULT MEDICAL EXAMINATION W/OUT ABNORMAL FINDINGS: ICD-10-CM

## 2024-07-30 DIAGNOSIS — E55.9 VITAMIN D DEFICIENCY, UNSPECIFIED: ICD-10-CM

## 2024-07-30 DIAGNOSIS — R55 SYNCOPE AND COLLAPSE: ICD-10-CM

## 2024-07-30 DIAGNOSIS — E04.1 NONTOXIC SINGLE THYROID NODULE: ICD-10-CM

## 2024-07-30 DIAGNOSIS — Z13.228 ENCOUNTER FOR SCREENING FOR OTHER METABOLIC DISORDERS: ICD-10-CM

## 2024-07-30 DIAGNOSIS — I83.90 ASYMPTOMATIC VARICOSE VEINS OF UNSPECIFIED LOWER EXTREMITY: ICD-10-CM

## 2024-07-30 PROCEDURE — 93000 ELECTROCARDIOGRAM COMPLETE: CPT

## 2024-07-30 PROCEDURE — 99395 PREV VISIT EST AGE 18-39: CPT

## 2024-07-30 RX ORDER — ESCITALOPRAM OXALATE 5 MG/1
5 TABLET ORAL
Qty: 90 | Refills: 3 | Status: ACTIVE | COMMUNITY
Start: 2024-07-30 | End: 1900-01-01

## 2024-07-30 NOTE — PHYSICAL EXAM
[Well Developed] : well developed [Well Nourished] : well nourished [No Acute Distress] : no acute distress [Normal Conjunctiva] : normal conjunctiva [Normal Venous Pressure] : normal venous pressure [No Carotid Bruit] : no carotid bruit [Normal S1, S2] : normal S1, S2 [No Murmur] : no murmur [No Rub] : no rub [No Gallop] : no gallop [Clear Lung Fields] : clear lung fields [Good Air Entry] : good air entry [No Respiratory Distress] : no respiratory distress  [Soft] : abdomen soft [Non Tender] : non-tender [No Masses/organomegaly] : no masses/organomegaly [Normal Bowel Sounds] : normal bowel sounds [Normal Gait] : normal gait [No Edema] : no edema [No Cyanosis] : no cyanosis [No Clubbing] : no clubbing [No Varicosities] : no varicosities [No Rash] : no rash [No Skin Lesions] : no skin lesions [Moves all extremities] : moves all extremities [No Focal Deficits] : no focal deficits [Normal Speech] : normal speech [Alert and Oriented] : alert and oriented [Normal memory] : normal memory [de-identified] : varicose veins small left leg

## 2024-07-30 NOTE — HISTORY OF PRESENT ILLNESS
[FreeTextEntry1] : This is a 25 y/o female here today for an annual wellness exam. Patient reports her insurance does not cover effexor, she has been off medication for 6 months and notes increased anxiety.  Patient reports 1 month ago she had episode of near syncope when she was in a hospital shadowing a doctor. Symptoms preceded by heat flash and dizziness. She denies LOC. BP was low at that time.  Patient was noted with thyroid cyst when doing practice ultrasound at school.  Patient denies chest pain, dyspnea, palpitations changes in bowel/bladder habits or appetite.  pt requests vascular eval for varicose veins

## 2024-07-31 ENCOUNTER — APPOINTMENT (OUTPATIENT)
Dept: CARDIOLOGY | Facility: CLINIC | Age: 24
End: 2024-07-31
Payer: COMMERCIAL

## 2024-07-31 PROCEDURE — 93306 TTE W/DOPPLER COMPLETE: CPT

## 2024-08-03 LAB
25(OH)D3 SERPL-MCNC: 26.7 NG/ML
ALBUMIN SERPL ELPH-MCNC: 4.6 G/DL
ALP BLD-CCNC: 71 U/L
ALT SERPL-CCNC: 10 U/L
ANION GAP SERPL CALC-SCNC: 11 MMOL/L
APPEARANCE: CLEAR
AST SERPL-CCNC: 14 U/L
BACTERIA: ABNORMAL /HPF
BASOPHILS # BLD AUTO: 0.02 K/UL
BASOPHILS NFR BLD AUTO: 0.2 %
BILIRUB SERPL-MCNC: 0.2 MG/DL
BILIRUBIN URINE: NEGATIVE
BLOOD URINE: NEGATIVE
BUN SERPL-MCNC: 14 MG/DL
CALCIUM SERPL-MCNC: 9.4 MG/DL
CAST: 0 /LPF
CHLORIDE SERPL-SCNC: 107 MMOL/L
CHOLEST SERPL-MCNC: 163 MG/DL
CO2 SERPL-SCNC: 24 MMOL/L
COLOR: YELLOW
CREAT SERPL-MCNC: 0.9 MG/DL
EGFR: 93 ML/MIN/1.73M2
EOSINOPHIL # BLD AUTO: 0.09 K/UL
EOSINOPHIL NFR BLD AUTO: 1 %
EPITHELIAL CELLS: 5 /HPF
ESTIMATED AVERAGE GLUCOSE: 108 MG/DL
FERRITIN SERPL-MCNC: 35 NG/ML
FOLATE SERPL-MCNC: 9.5 NG/ML
GLUCOSE QUALITATIVE U: NEGATIVE MG/DL
GLUCOSE SERPL-MCNC: 86 MG/DL
HBA1C MFR BLD HPLC: 5.4 %
HCG SERPL-MCNC: <1 MIU/ML
HCT VFR BLD CALC: 41.5 %
HDLC SERPL-MCNC: 52 MG/DL
HGB BLD-MCNC: 13.5 G/DL
IMM GRANULOCYTES NFR BLD AUTO: 0.2 %
IRON SATN MFR SERPL: 24 %
IRON SERPL-MCNC: 90 UG/DL
KETONES URINE: NEGATIVE MG/DL
LDLC SERPL CALC-MCNC: 96 MG/DL
LEUKOCYTE ESTERASE URINE: NEGATIVE
LYMPHOCYTES # BLD AUTO: 2.36 K/UL
LYMPHOCYTES NFR BLD AUTO: 26.6 %
MAGNESIUM SERPL-MCNC: 2.2 MG/DL
MAN DIFF?: NORMAL
MCHC RBC-ENTMCNC: 29 PG
MCHC RBC-ENTMCNC: 32.5 GM/DL
MCV RBC AUTO: 89.1 FL
MICROSCOPIC-UA: NORMAL
MONOCYTES # BLD AUTO: 0.9 K/UL
MONOCYTES NFR BLD AUTO: 10.1 %
NEUTROPHILS # BLD AUTO: 5.48 K/UL
NEUTROPHILS NFR BLD AUTO: 61.9 %
NITRITE URINE: NEGATIVE
NONHDLC SERPL-MCNC: 111 MG/DL
PH URINE: 7
PHOSPHATE SERPL-MCNC: 3.1 MG/DL
PLATELET # BLD AUTO: 286 K/UL
POTASSIUM SERPL-SCNC: 4.5 MMOL/L
PROT SERPL-MCNC: 7.7 G/DL
PROTEIN URINE: NEGATIVE MG/DL
RBC # BLD: 4.66 M/UL
RBC # FLD: 13 %
RED BLOOD CELLS URINE: 2 /HPF
SODIUM SERPL-SCNC: 142 MMOL/L
SPECIFIC GRAVITY URINE: 1.03
T4 FREE SERPL-MCNC: 1.3 NG/DL
TIBC SERPL-MCNC: 370 UG/DL
TRIGL SERPL-MCNC: 84 MG/DL
TSH SERPL-ACNC: 1.53 UIU/ML
UIBC SERPL-MCNC: 280 UG/DL
UROBILINOGEN URINE: 1 MG/DL
VIT B12 SERPL-MCNC: 312 PG/ML
WBC # FLD AUTO: 8.87 K/UL
WHITE BLOOD CELLS URINE: 0 /HPF

## 2024-08-11 PROCEDURE — 93241 XTRNL ECG REC>48HR<7D: CPT

## 2024-08-15 ENCOUNTER — RX RENEWAL (OUTPATIENT)
Age: 24
End: 2024-08-15

## 2024-11-13 ENCOUNTER — RX RENEWAL (OUTPATIENT)
Age: 24
End: 2024-11-13

## 2024-12-25 PROBLEM — F10.90 ALCOHOL USE: Status: ACTIVE | Noted: 2020-12-21

## 2025-02-07 ENCOUNTER — APPOINTMENT (OUTPATIENT)
Dept: CARDIOLOGY | Facility: CLINIC | Age: 25
End: 2025-02-07
Payer: COMMERCIAL

## 2025-02-07 VITALS
SYSTOLIC BLOOD PRESSURE: 90 MMHG | HEART RATE: 75 BPM | DIASTOLIC BLOOD PRESSURE: 60 MMHG | HEIGHT: 63 IN | OXYGEN SATURATION: 99 % | BODY MASS INDEX: 27.11 KG/M2 | WEIGHT: 153 LBS | TEMPERATURE: 98.1 F

## 2025-02-07 DIAGNOSIS — R35.0 FREQUENCY OF MICTURITION: ICD-10-CM

## 2025-02-07 DIAGNOSIS — F32.A DEPRESSION, UNSPECIFIED: ICD-10-CM

## 2025-02-07 DIAGNOSIS — J35.8 OTHER CHRONIC DISEASES OF TONSILS AND ADENOIDS: ICD-10-CM

## 2025-02-07 DIAGNOSIS — E04.1 NONTOXIC SINGLE THYROID NODULE: ICD-10-CM

## 2025-02-07 DIAGNOSIS — I83.90 ASYMPTOMATIC VARICOSE VEINS OF UNSPECIFIED LOWER EXTREMITY: ICD-10-CM

## 2025-02-07 DIAGNOSIS — G43.909 MIGRAINE, UNSPECIFIED, NOT INTRACTABLE, W/OUT STATUS MIGRAINOSUS: ICD-10-CM

## 2025-02-07 DIAGNOSIS — E55.9 VITAMIN D DEFICIENCY, UNSPECIFIED: ICD-10-CM

## 2025-02-07 DIAGNOSIS — R55 SYNCOPE AND COLLAPSE: ICD-10-CM

## 2025-02-07 DIAGNOSIS — Z13.228 ENCOUNTER FOR SCREENING FOR OTHER METABOLIC DISORDERS: ICD-10-CM

## 2025-02-07 DIAGNOSIS — R42 DIZZINESS AND GIDDINESS: ICD-10-CM

## 2025-02-07 PROCEDURE — 93000 ELECTROCARDIOGRAM COMPLETE: CPT

## 2025-02-07 PROCEDURE — 99214 OFFICE O/P EST MOD 30 MIN: CPT | Mod: 25

## 2025-02-08 LAB
25(OH)D3 SERPL-MCNC: 15 NG/ML
ALBUMIN SERPL ELPH-MCNC: 4.1 G/DL
ALP BLD-CCNC: 68 U/L
ALT SERPL-CCNC: 22 U/L
ANION GAP SERPL CALC-SCNC: 10 MMOL/L
APPEARANCE: ABNORMAL
AST SERPL-CCNC: 18 U/L
BACTERIA: ABNORMAL /HPF
BASOPHILS # BLD AUTO: 0.01 K/UL
BASOPHILS NFR BLD AUTO: 0.2 %
BILIRUB SERPL-MCNC: 0.2 MG/DL
BILIRUBIN URINE: NEGATIVE
BLOOD URINE: ABNORMAL
BUN SERPL-MCNC: 9 MG/DL
CALCIUM SERPL-MCNC: 8.5 MG/DL
CAST: 2 /LPF
CHLORIDE SERPL-SCNC: 107 MMOL/L
CHOLEST SERPL-MCNC: 145 MG/DL
CO2 SERPL-SCNC: 25 MMOL/L
COLOR: YELLOW
CREAT SERPL-MCNC: 0.86 MG/DL
EGFR: 97 ML/MIN/1.73M2
EOSINOPHIL # BLD AUTO: 0.01 K/UL
EOSINOPHIL NFR BLD AUTO: 0.2 %
EPITHELIAL CELLS: 13 /HPF
ESTIMATED AVERAGE GLUCOSE: 105 MG/DL
GLUCOSE QUALITATIVE U: NEGATIVE MG/DL
GLUCOSE SERPL-MCNC: 86 MG/DL
HBA1C MFR BLD HPLC: 5.3 %
HCT VFR BLD CALC: 41 %
HDLC SERPL-MCNC: 49 MG/DL
HGB BLD-MCNC: 13.2 G/DL
IMM GRANULOCYTES NFR BLD AUTO: 0 %
KETONES URINE: NEGATIVE MG/DL
LDLC SERPL CALC-MCNC: 82 MG/DL
LEUKOCYTE ESTERASE URINE: ABNORMAL
LYMPHOCYTES # BLD AUTO: 1.61 K/UL
LYMPHOCYTES NFR BLD AUTO: 38.7 %
MAN DIFF?: NORMAL
MCHC RBC-ENTMCNC: 29.3 PG
MCHC RBC-ENTMCNC: 32.2 G/DL
MCV RBC AUTO: 91.1 FL
MICROSCOPIC-UA: NORMAL
MONOCYTES # BLD AUTO: 0.68 K/UL
MONOCYTES NFR BLD AUTO: 16.3 %
NEUTROPHILS # BLD AUTO: 1.85 K/UL
NEUTROPHILS NFR BLD AUTO: 44.6 %
NITRITE URINE: NEGATIVE
NONHDLC SERPL-MCNC: 96 MG/DL
PH URINE: 6
PLATELET # BLD AUTO: 234 K/UL
POTASSIUM SERPL-SCNC: 4 MMOL/L
PROT SERPL-MCNC: 6.8 G/DL
PROTEIN URINE: NORMAL MG/DL
RBC # BLD: 4.5 M/UL
RBC # FLD: 13.1 %
RED BLOOD CELLS URINE: 10 /HPF
REVIEW: NORMAL
SODIUM SERPL-SCNC: 142 MMOL/L
SPECIFIC GRAVITY URINE: >1.03
T3FREE SERPL-MCNC: 3.12 PG/ML
T4 FREE SERPL-MCNC: 1.1 NG/DL
TRIGL SERPL-MCNC: 72 MG/DL
TSH SERPL-ACNC: 1.06 UIU/ML
UROBILINOGEN URINE: 0.2 MG/DL
WBC # FLD AUTO: 4.16 K/UL
WHITE BLOOD CELLS URINE: 13 /HPF
YEAST-LIKE CELLS: PRESENT

## 2025-02-10 PROBLEM — R35.0 URINARY FREQUENCY: Status: ACTIVE | Noted: 2025-02-10

## 2025-02-10 RX ORDER — COLD-HOT PACK
125 MCG EACH MISCELLANEOUS
Qty: 90 | Refills: 1 | Status: ACTIVE | COMMUNITY
Start: 2025-02-10

## 2025-02-10 RX ORDER — NITROFURANTOIN (MONOHYDRATE/MACROCRYSTALS) 25; 75 MG/1; MG/1
100 CAPSULE ORAL
Qty: 14 | Refills: 0 | Status: ACTIVE | COMMUNITY
Start: 2025-02-10 | End: 1900-01-01

## 2025-04-18 ENCOUNTER — APPOINTMENT (OUTPATIENT)
Dept: OBGYN | Facility: CLINIC | Age: 25
End: 2025-04-18
Payer: COMMERCIAL

## 2025-04-18 VITALS
HEIGHT: 63 IN | BODY MASS INDEX: 27.82 KG/M2 | SYSTOLIC BLOOD PRESSURE: 121 MMHG | WEIGHT: 157 LBS | DIASTOLIC BLOOD PRESSURE: 83 MMHG

## 2025-04-18 DIAGNOSIS — Z01.419 ENCOUNTER FOR GYNECOLOGICAL EXAMINATION (GENERAL) (ROUTINE) W/OUT ABNORMAL FINDINGS: ICD-10-CM

## 2025-04-18 PROCEDURE — 99395 PREV VISIT EST AGE 18-39: CPT

## 2025-04-23 LAB — CYTOLOGY CVX/VAG DOC THIN PREP: NORMAL
